# Patient Record
Sex: FEMALE | Race: WHITE
[De-identification: names, ages, dates, MRNs, and addresses within clinical notes are randomized per-mention and may not be internally consistent; named-entity substitution may affect disease eponyms.]

---

## 2018-12-30 NOTE — RAD
PA AND LATERAL CHEST XRAY:

 

DATE: 12/30/2018.

 

HISTORY: 

Cough for 3-4 days.  Subjective fever.

 

COMPARISON: 

9/8/2018.

 

FINDINGS: 

Cardiac silhouette is borderline enlarged.  The pulmonary vasculature is within normal limits.  There
 are increased interstitial densities at the right lung base more prominent than on the prior exam.  
No focal consolidation or pleural fluid is seen.  Findings could be related to atelectasis, although 
developing infectious process cannot be excluded.  Lungs are otherwise clear.  No pleural effusion is
 seen.  The left lateral costophrenic angle is partially excluded from view.  Osseous structures are 
intact.

 

IMPRESSION: 

Increased linear and interstitial densities right lung base which could be related to atelectasis, bu
t developing area of pneumonitis could not be excluded.  Followup examination is suggested as clinica
lly indicated.

 

POS: [facility]

## 2020-05-19 NOTE — PDOC.LDHP
Labor and Delivery H&P


Chief complaint: contractions


HPI: 





Patient was woken up at 0400 with some back pain and mild cramping. It 

progressed to getting stronger so she decided to come to the hospital. 


Current gestational age (weeks): 39


Due date: 20


Grav: 5


Para: 1


Current medications: pre- vitamins


Allergies/Adverse Reactions: 


 Allergies











Allergy/AdvReac Type Severity Reaction Status Date / Time


 


No Known Allergies Allergy   Verified 20 13:47











Social history: none





- Physical Exam


Vital signs reviewed and normal: yes


General: breathing through contractions


Lungs: nonlabored breathing


Abdomen: gravid


FHT: category 1





- Vaginal Exam


cm dilated: 6


Effacement: 100%


Station: -1





- OB Labs


Blood type: AB


RH: positive


Antibody Screen: negative


HIV: negative


RPR: negative


HEPSAg: negative


1 hour GCT: negative


GBS: negative


Urine drug screen: negative


Rubella: immune





- Assessment


L&D Assessment: term patient in labor





- Plan


Plan: admit to L&D, anesthesia consult for pain management

## 2022-08-25 ENCOUNTER — HOSPITAL ENCOUNTER (OUTPATIENT)
Dept: HOSPITAL 92 - BICMAMMO | Age: 42
Discharge: HOME | End: 2022-08-25
Payer: COMMERCIAL

## 2022-08-25 DIAGNOSIS — Z80.3: ICD-10-CM

## 2022-08-25 DIAGNOSIS — Z12.31: Primary | ICD-10-CM

## 2022-08-25 PROCEDURE — 77067 SCR MAMMO BI INCL CAD: CPT
